# Patient Record
Sex: OTHER/UNKNOWN | Race: WHITE | URBAN - METROPOLITAN AREA
[De-identification: names, ages, dates, MRNs, and addresses within clinical notes are randomized per-mention and may not be internally consistent; named-entity substitution may affect disease eponyms.]

---

## 2022-02-14 ENCOUNTER — OFFICE VISIT (OUTPATIENT)
Dept: PRIMARY CARE CLINIC | Age: 47
End: 2022-02-14
Payer: COMMERCIAL

## 2022-02-14 VITALS
OXYGEN SATURATION: 97 % | SYSTOLIC BLOOD PRESSURE: 124 MMHG | DIASTOLIC BLOOD PRESSURE: 76 MMHG | HEART RATE: 81 BPM | BODY MASS INDEX: 34.04 KG/M2 | WEIGHT: 185 LBS | RESPIRATION RATE: 16 BRPM | HEIGHT: 62 IN

## 2022-02-14 DIAGNOSIS — M79.672 FOOT PAIN, BILATERAL: ICD-10-CM

## 2022-02-14 DIAGNOSIS — M79.671 FOOT PAIN, BILATERAL: ICD-10-CM

## 2022-02-14 DIAGNOSIS — Z76.89 ENCOUNTER TO ESTABLISH CARE: Primary | ICD-10-CM

## 2022-02-14 DIAGNOSIS — Z12.4 CERVICAL CANCER SCREENING: ICD-10-CM

## 2022-02-14 DIAGNOSIS — Z12.11 COLON CANCER SCREENING: ICD-10-CM

## 2022-02-14 DIAGNOSIS — M25.562 ACUTE PAIN OF LEFT KNEE: ICD-10-CM

## 2022-02-14 PROCEDURE — 99204 OFFICE O/P NEW MOD 45 MIN: CPT | Performed by: NURSE PRACTITIONER

## 2022-02-14 SDOH — ECONOMIC STABILITY: FOOD INSECURITY: WITHIN THE PAST 12 MONTHS, YOU WORRIED THAT YOUR FOOD WOULD RUN OUT BEFORE YOU GOT MONEY TO BUY MORE.: NEVER TRUE

## 2022-02-14 SDOH — ECONOMIC STABILITY: FOOD INSECURITY: WITHIN THE PAST 12 MONTHS, THE FOOD YOU BOUGHT JUST DIDN'T LAST AND YOU DIDN'T HAVE MONEY TO GET MORE.: NEVER TRUE

## 2022-02-14 ASSESSMENT — PATIENT HEALTH QUESTIONNAIRE - PHQ9
2. FEELING DOWN, DEPRESSED OR HOPELESS: 0
SUM OF ALL RESPONSES TO PHQ9 QUESTIONS 1 & 2: 0
1. LITTLE INTEREST OR PLEASURE IN DOING THINGS: 0
SUM OF ALL RESPONSES TO PHQ QUESTIONS 1-9: 0

## 2022-02-14 ASSESSMENT — SOCIAL DETERMINANTS OF HEALTH (SDOH): HOW HARD IS IT FOR YOU TO PAY FOR THE VERY BASICS LIKE FOOD, HOUSING, MEDICAL CARE, AND HEATING?: NOT HARD AT ALL

## 2022-02-14 NOTE — PROGRESS NOTES
[facility] PRIMARY CARE  . [address]  [address] [address]  Dept: [phone]  Dept Fax: [phone]    Mariella Fuentes is a 56 y.o. adult who presents today for [facility] medical conditions/complaintsas noted below. Mariella Fuentes is c/o of Knee Pain (LT-meniscus/patella pain), Flu Vaccine (received outside of [place] [address]), and Colon Cancer Screening (order cologuard)        HPI:     Pt presents for an office   bp stable  Weight stable    Pt presents for an office visit. She is training for a race. She stopped running 2 years ago. She is getting back into it. She is doin ga 10 mile race in April. She is unsure if its an IT or runners knee. She has not changed her shoes until recently. 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 Podiatrist . No previous issues with her knee that lasted this long. She would like to go to PT and has a place in mind. She has not ran in 4 days. No longer has gyn. Las pap was a long time ago  Cycles are irregular. This just started to be irregular. Otherwise healthy. Working on losing weight and being more active. History reviewed. No pertinent past medical history. History reviewed. No pertinent surgical history. History reviewed. No pertinent family history. Social History     Tobacco Use    Smoking status: Passive Smoke Exposure - Never Smoker    Smokeless tobacco: Never Used   Substance Use Topics    Alcohol use: Yes     Comment: socially, twice monthly      No current outpatient medications on file. No current facility-administered medications for this visit.      No Known Allergies    Health Maintenance   Topic Date Due    Hepatitis C screen  Never done    HIV screen  Never done    DTaP/Tdap/Td vaccine (1 - Tdap) Never done    Diabetes screen  Never done    Lipid screen  Never done    Colon cancer screen colonoscopy  Never done    Flu vaccine (1) 2022 (Originally 2021)   Lorri COVID-19 Vaccine (1) 12/29/2022 (Originally 2/8/1981)    Depression Screen  02/14/2023    Hepatitis A vaccine  Aged Out    Hepatitis B vaccine  Aged Out    Hib vaccine  Aged Out    Meningococcal (ACWY) vaccine  Aged Out    Pneumococcal 0-64 years Vaccine  Aged Out       :     Review of Systems   Constitutional: Negative for chills, fatigue and fever. HENT: Negative for ear discharge, ear pain, sinus pressure, sinus pain, sore throat and trouble swallowing. Eyes: Negative for discharge, redness and itching. Respiratory: Negative for cough, chest tightness, shortness of breath and wheezing. Cardiovascular: Negative for chest pain. Gastrointestinal: Negative for abdominal pain, diarrhea, nausea and vomiting. Genitourinary: Negative for difficulty urinating. Musculoskeletal: Negative for arthralgias (left knee pain ) and neck pain. Skin: Negative for rash. Neurological: Negative for dizziness, weakness, light-headedness and headaches. All other systems reviewed and are negative. Objective:     Physical Exam  Constitutional:       Appearance: Normal appearance. Vicente Prim is normal weight. HENT:      Head: Normocephalic and atraumatic. Nose: Nose normal.   Eyes:      Extraocular Movements: Extraocular movements intact. Conjunctiva/sclera: Conjunctivae normal.      Pupils: Pupils are equal, round, and reactive to light. Cardiovascular:      Rate and Rhythm: Normal rate and regular rhythm. Pulses: Normal pulses. Heart sounds: Normal heart sounds. Pulmonary:      Effort: Pulmonary effort is normal.      Breath sounds: Normal breath sounds. Abdominal:      General: Abdomen is flat. Palpations: Abdomen is soft. Musculoskeletal:         General: Normal range of motion. Cervical back: Neck supple. Left knee: No bony tenderness or crepitus. Normal range of motion. No tenderness. No LCL laxity, MCL laxity, ACL laxity or PCL laxity.   Skin: General: Skin is warm and dry. Capillary Refill: Capillary refill takes less than 2 seconds. Neurological:      General: No focal deficit present. Mental Status: Clearance Lynn is alert and oriented to person, place, and time. Psychiatric:         Mood and Affect: Mood normal.       /76   Pulse 81   Resp 16   Ht 5' 2\" (1.575 m)   Wt 185 lb (83.9 kg)   LMP 01/19/2022 (Approximate)   SpO2 97%   Breastfeeding No   BMI 33.84 kg/m²     Assessment:       Diagnosis Orders   1. Acute pain of left knee  [facility] - [place]    XR KNEE LEFT (MIN 4 VIEWS)   2. Colon cancer screening  Fecal DNA Colorectal cancer screening (Cologuard)   3. Foot pain, bilateral  [facility] DPM, Podiatry, [place]   4. Cervical cancer screening  [facility] [place], [address] [place] [address], Gynecology, [place]       :      Return in about 1 month (around 3/14/2022) for knee follow up. 1.  Encounter establish care  -Reviewed available records. Limited information provided  2. Acute left knee pain  -Rx for x-rays.  -Referral to physical therapy  -Discussed ice, rest, elevation and anti-inflammatories  3. Colon cancer screening  Rx for Cologuard  4. Foot pain   referral to podiatry  5. Cervical cancer screening  -Referral to gynecology    Patient follow-up in 1 month for reevaluation  Declines annual lab work  Orders Placed This Encounter   Procedures    Fecal DNA Colorectal cancer screening (Cologuard)    XR KNEE LEFT (MIN 4 VIEWS)     Standing Status:   Future     Standing Expiration Date:   2/14/2023     Order Specific Question:   Reason for exam:     Answer:   left knee pain.  started running   [address] [place]     Referral Priority:   Routine     Referral Type:   Eval and Treat     Referral Reason:   Specialty Services Required     Requested Specialty:   Physical Therapy     Number of Visits Requested:   [facility] Ping Bowens DPM, Podiatry, [place]     Referral Priority:   Routine     Referral Type:   Eval and Treat     Referral Reason:   Specialty Services Required     Referred to Provider:   Aniyah Reynoso DPM     Requested Specialty:   Podiatry     Number of Visits Requested:   [facility] KATIE Trammell, Gynecology, [facility]     Referral Priority:   Routine     Referral Type:   Eval and Treat     Referral Reason:   Specialty Services Required     Referred to Provider:   LELA Chopra CNP     Requested Specialty:   [facility] Nurse Practitioner     Number of Visits Requested:   1     No orders of the defined types were placed in this encounter. Patient given educational materials - seepatient instructions. Discussed use, benefit, and side effects of prescribed medications. All patient questions answered. Pt voiced understanding. Reviewed health maintenance. Instructed to continue current medications, diet and exercise. Patient agreedwith treatment plan. Follow up as directed.       Electronically signed by LELA Pastrana CNP on 2/15/2022at 8:23 AM

## 2022-02-15 ENCOUNTER — HOSPITAL ENCOUNTER (OUTPATIENT)
Dept: PHYSICAL THERAPY | Facility: CLINIC | Age: 47
Setting detail: THERAPIES SERIES
End: 2022-02-15
Payer: COMMERCIAL

## 2022-02-15 ASSESSMENT — ENCOUNTER SYMPTOMS
SINUS PAIN: 0
ABDOMINAL PAIN: 0
TROUBLE SWALLOWING: 0
COUGH: 0
SINUS PRESSURE: 0
WHEEZING: 0
DIARRHEA: 0
NAUSEA: 0
CHEST TIGHTNESS: 0
EYE ITCHING: 0
VOMITING: 0
EYE DISCHARGE: 0
SORE THROAT: 0
SHORTNESS OF BREATH: 0
EYE REDNESS: 0

## 2022-02-23 ENCOUNTER — HOSPITAL ENCOUNTER (OUTPATIENT)
Dept: PHYSICAL THERAPY | Facility: CLINIC | Age: 47
Setting detail: THERAPIES SERIES
Discharge: HOME | End: 2022-02-23
Payer: COMMERCIAL

## 2022-02-23 PROCEDURE — 97161 PT EVAL LOW COMPLEX 20 MIN: CPT

## 2022-02-23 PROCEDURE — 97110 THERAPEUTIC EXERCISES: CPT
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
## 2022-03-01 ENCOUNTER — HOSPITAL ENCOUNTER (OUTPATIENT)
Dept: PHYSICAL THERAPY | Facility: CLINIC | Age: 47
Setting detail: THERAPIES SERIES
Discharge: HOME | End: 2022-03-01
Payer: COMMERCIAL

## 2022-03-01 PROCEDURE — 97140 MANUAL THERAPY 1/> REGIONS: CPT

## 2022-03-01 PROCEDURE — 97750 PHYSICAL PERFORMANCE TEST: CPT

## 2022-03-01 PROCEDURE — 97110 THERAPEUTIC EXERCISES: CPT

## 2022-03-01 NOTE — FLOWSHEET NOTE
[] [facility]) - [facility] &  Therapy  [address]  [phone]  F: [phone] [] [address]  [address]   [address]  P: [phone]  F: [phone] [x] [address] &  Therapy  [address] [address]  P: [phone]  F: [phone] [] [address]  P: [phone]  F: [phone] [] [address]  [facility]   [address]   [place]: [phone]  F: [phone]      Physical Therapy Daily Treatment Note    Date:  3/1/2022  Patient Name:  Loel Credit    :  1975  MRN: [id number]  Physician: POPPY Mcdowell                           Insurance: Carol Garibay (100 visits/yr; no copay, ded 3000/2914 remains, 20%)  Medical Diagnosis: L knee pain                   Rehab Codes: M25.562, M25.662  Onset date:    1/15/22                                    Next Dr's appt.:3/2  Visit# / total visits: 2/10     Cancels/No Shows: 0    Subjective:    Pain:  [x] Yes  [] No Location: knee  Pain Rating: (0-10 scale) 0/10  Pain altered Tx:  [x] No  [] Yes  Action:  Comments:Pt reports she did try running 1 min on 1 min off for 2 miles and it was like night and day. Knee felt so much better    Objective:  Manual:Hypervolt B calf   Precautions:standard  Exercises:  Exercise Reps/ Time Weight/ Level Issued for HEP   Comments   Prone             Knee hang 5'   x *     Hip ext (glut max)             [place] hip ext 30-50           Supine             Quad sets 10x10\"   x      SAQ 20 5 lbs x      Tony calf stretch 3x30\"   x * Hips to remain in neutral to ext   [employer]             Sidelying             Clams 90/30 deg             Susan hip abd             Quadruped             Donkey kicks 30-50       Bar across pelvis   [place] twist             Gym             Burrito stretch 3x30\"   x *     Monster walks             Hip thrusts             Step downs         Posterior and lateral   GTE contract and activate 10x10\"  x x    Toe yoga x10  x x    Toe spreading *  x     Fig 8 banded HR x20 orange x x                                      Specific Instructions for next treatment:  1. Recheck knee extension, ankle DF, GTE   2. Progress glute med and max strengthening ex, add split squat isometric  3. STM to calves, glutes  4. Recheck foot/ankle control            Video Run Analysis   Warm up: 2min walk   Pace: 15:48  min/mile   Duration: 7 minutes    Shoes: [employer]ni + custom orthotics   Jackie: 160 spm    Frontal plane deviations:    Increased pronation    Dynamic genu varus    Contralateral pelvic drop    Increased hip adduction       Sagittal plane deviations:     Near full knee extension at initial contact    Limited hip ext from midstance to toe off    Knees extend over toes at midstance    Increased forward trunk lean           Treatment Charges: Mins Units   []  Modalities     [x]  Ther Exercise 35 2   [x]  Manual Therapy 15 1   []  Ther Activities     [x]  Physical Perf Eval 5 1   []  Vasocompression     []  Other     Total Treatment time 55 4       Assessment: [x] Progressing toward goals. Pt has less pain at the knee. Still lacking knee extension upon arrival but improved after manual and stretching at calf. Physical Perf eval completed with run  flaws as above. After cues given for posture to be more upright some of mechanical flaws improved. Still need to gain strength to correct some of the frontal plane deviations that are leading to knee varus. [] No change.      [] Other:  [x] Patient would continue to benefit from skilled physical therapy services in order to:increase knee ext ROM to 0 deg,improve frontal plane stabilization and SL squat and give guidance on her training program so that she can return to running and complete the 2 day race [facility]      STG: (to be met in 5 treatments)  1. ? Pain:<3/10 so that she can initiate a return to run  2. ? ROM: achieve 0 deg of knee ext  3. ? Strength: at least 4/5 with nona hip ext  4. ? Function: able to run/walk 1.5 miles with <3/10 pain  5. UWRI score to >24/36  6. Independent with Home Exercise Programs     LTG: (to be met in 10 treatments)  1. No pain in L knee  2. MMT with nona hip ext to 5/5  3. UWRI score to >30/36  4. Able to return to running so that she can complete [place] 10k and 10m. 5.                   Patient goals: \"get rid of the knee pain so I can run\"    Pt. Education:  [x] Yes  [] No  [] Reviewed Prior HEP/Ed  Method of Education: [x] Verbal  [x] Demo  [x] Written  Access Code: [email or web address]  URL: [email or web address]  Date: 03/01/2022  Prepared by: Isabel Dials    Exercises  Bridge - 1 x daily - 7 x weekly - 3 sets - 10 reps  Sidelying Hip Abduction at Fidelia Delroy - 1 x daily - 7 x weekly - 3 sets - 10 reps  Heel rises with counter support - 1 x daily - 7 x weekly - 3 sets - 10 reps  Seated Lesser Toes Extension - 2 x daily - 7 x weekly - 3 sets - 10 reps  Seated Great Toe Extension - 2 x daily - 7 x weekly - 3 sets - 10 reps  Seated Self Great Toe Stretch - 2 x daily - 7 x weekly - 10 reps - 10sec hold  Toe Spreading - 2 x daily - 7 x weekly - 3 sets - 10 reps    Comprehension of Education:  [x] Verbalizes understanding. [] Demonstrates understanding. [] Needs review. [] Demonstrates/verbalizes HEP/Ed previously given. Plan: [x] Continue current frequency toward long and short term goals.     [] Specific Instructions for subsequent treatments:       Time In:1300          Time Out: 1410    Electronically signed by:  Frances Marcial, PT

## 2022-03-08 ENCOUNTER — HOSPITAL ENCOUNTER (OUTPATIENT)
Dept: PHYSICAL THERAPY | Facility: CLINIC | Age: 47
Setting detail: THERAPIES SERIES
Discharge: HOME | End: 2022-03-08
Payer: COMMERCIAL

## 2022-03-08 PROCEDURE — 97140 MANUAL THERAPY 1/> REGIONS: CPT

## 2022-03-08 PROCEDURE — 97112 NEUROMUSCULAR REEDUCATION: CPT

## 2022-03-08 PROCEDURE — 97110 THERAPEUTIC EXERCISES: CPT

## 2022-03-08 NOTE — FLOWSHEET NOTE
[] [facility]) - [facility] &  Therapy  [address]  [phone]  F: [phone] [] [address]  [address]   [address]  P: [phone]  F: [phone] [x] [address] &  Therapy  [address] [facility] [address]  P: [phone]  F: [phone] [] [address]  P: [phone]  F: [phone] [] [address]  [facility]   [address]   [place]: [phone]  F: [phone]      Physical Therapy Daily Treatment Note    Date:  3/8/2022  Patient Name:  Ashley Gaitan    :  1975  MRN: [id number]  Physician: Jeaneth Rebollar, APRN-CNP                           Insurance: [employer] (100 visits/yr; no copay, ded 3000/2911 remains, 20%)  Medical Diagnosis: L knee pain                   Rehab Codes: M25.562, M25.662  Onset date:    1/15/22                                    Next Dr's appt.:3/2  Visit# / total visits: 3/10     Cancels/No Shows: 0    Subjective:    Pain:  [x] Yes  [] No Location: knee  Pain Rating: (0-10 scale) 0/10  Pain altered Tx:  [x] No  [] Yes  Action:  Comments:Pt reports she did well for running. Was able to do 1'on4'off x6 cycles. The first time there was mild pain. Second time no pain.     Objective:  Manual:Hypervolt B calf   Precautions:standard  Exercises:  Exercise Reps/ Time Weight/ Level Issued for HEP   Comments   Prone             Knee hang 5'   x *     Hip ext (glut max)  2x10      x     [place] hip ext            Supine             Quad sets 10x10\"   x      SAQ 20 5 lbs x      Tony calf stretch 3x30\"   x * Hips to remain in neutral to ext   Bridge              Sidelying             Clams 90/30 deg             hip abd  2x10   x  x     Quadruped             Donkey kicks        Bar across pelvis   [place] twist             Gym             Burrito stretch 3x30\"   x *     Monster walks             Hip thrusts             Step downs         Posterior and lateral   GTE contract and activate 10x10\"  x x    Toe yoga x10  x x    Toe spreading *  x     Fig 8 banded HR x20 orange x x    Isometric split squat 3x30\"  x x                              Specific Instructions for next treatment:  1. Recheck knee extension, ankle DF, GTE   2. Progress glute med and max strengthening ex, add split squat isometric  3. STM to calves, glutes  4. Recheck foot/ankle control            NMR   Warm up: 2min walk   Pace: 15:48  min/mile   Duration: 15 minutes 2'on/3'off x3   Shoes: [employer] + custom orthotics   Jackie: 164 spm    Cue:lift knees            Treatment Charges: Mins Units   []  Modalities     [x]  Ther Exercise 30 2   [x]  Manual Therapy 15 1   []  Ther Activities     []  Physical Perf Eval     []  Vasocompression     [x]  NMR 15 1   Total Treatment time 60 4       Assessment: [x] Progressing toward goals. Knee extension lacking 1 deg. Pt is still lacking control at foot and ankle and strength at quad. Addressed this with Fig 8 Banded heel raises and split squat isometric to aide mechanics at midstance of running. Posture has improved significantly during run with pt pt more upright and landing closer to COM at IC [] No change. [] Other:  [x] Patient would continue to benefit from skilled physical therapy services in order to:increase knee ext ROM to 0 deg,improve frontal plane stabilization and SL squat and give guidance on her training program so that she can return to running and complete the 2 day race at [facility]      STG: (to be met in 5 treatments)  1. ? Pain:<3/10 so that she can initiate a return to run  2. ? ROM: achieve 0 deg of knee ext  3. ? Strength: at least 4/5 with nona hip ext  4. ? Function: able to run/walk 1.5 miles with <3/10 pain  5. UWRI score to >24/36  6.  Independent with Home Exercise Programs     LTG: (to be met in 10

## 2022-03-15 ENCOUNTER — OFFICE VISIT (OUTPATIENT)
Dept: PRIMARY CARE CLINIC | Age: 47
End: 2022-03-15
Payer: COMMERCIAL

## 2022-03-15 VITALS
DIASTOLIC BLOOD PRESSURE: 76 MMHG | OXYGEN SATURATION: 98 % | WEIGHT: 191 LBS | RESPIRATION RATE: 14 BRPM | BODY MASS INDEX: 34.93 KG/M2 | SYSTOLIC BLOOD PRESSURE: 116 MMHG | HEART RATE: 84 BPM

## 2022-03-15 DIAGNOSIS — M25.562 ACUTE PAIN OF LEFT KNEE: Primary | ICD-10-CM

## 2022-03-15 PROCEDURE — 99213 OFFICE O/P EST LOW 20 MIN: CPT | Performed by: NURSE PRACTITIONER

## 2022-03-15 ASSESSMENT — PATIENT HEALTH QUESTIONNAIRE - PHQ9
SUM OF ALL RESPONSES TO PHQ9 QUESTIONS 1 & 2: 0
SUM OF ALL RESPONSES TO PHQ QUESTIONS 1-9: 0
SUM OF ALL RESPONSES TO PHQ QUESTIONS 1-9: 0
1. LITTLE INTEREST OR PLEASURE IN DOING THINGS: 0
SUM OF ALL RESPONSES TO PHQ QUESTIONS 1-9: 0
SUM OF ALL RESPONSES TO PHQ QUESTIONS 1-9: 0
2. FEELING DOWN, DEPRESSED OR HOPELESS: 0

## 2022-03-15 ASSESSMENT — ENCOUNTER SYMPTOMS
EYE DISCHARGE: 0
SORE THROAT: 0
ABDOMINAL PAIN: 0
CHEST TIGHTNESS: 0
COUGH: 0
TROUBLE SWALLOWING: 0
NAUSEA: 0
EYE REDNESS: 0
SINUS PRESSURE: 0
DIARRHEA: 0
SHORTNESS OF BREATH: 0
SINUS PAIN: 0
WHEEZING: 0
EYE ITCHING: 0
VOMITING: 0

## 2022-03-15 NOTE — PROGRESS NOTES
[facility] PRIMARY CARE  . [address]  [address] [address]  Dept: [phone]  Dept Fax: [phone]    Cristina Rea is a 56 y.o. adult who presents today for [facility] medical conditions/complaintsas noted below. Cristina Rea is c/o of 1 Month Follow-Up (knee f/u)        HPI:     Pt presents for a one month follow up  bp stable  Weight is stable    Pt presents for a one month follow up for her knee. She has been going to PT. Her knee is a lot better. She is running on the treadmill at  and they are helping her running form. She did a 5 K yesterday and she felt good. She still has occasional knee pain but overall much better. She plans on continuing with PT until they says she is done. She has no other concerns today. History reviewed. No pertinent past medical history. History reviewed. No pertinent surgical history. History reviewed. No pertinent family history. Social History     Tobacco Use    Smoking status: Passive Smoke Exposure - Never Smoker    Smokeless tobacco: Never Used   Substance Use Topics    Alcohol use: Yes     Comment: socially, twice monthly      No current outpatient medications on file. No current facility-administered medications for this visit.      No Known Allergies    Health Maintenance   Topic Date Due    Hepatitis C screen  Never done    HIV screen  Never done    Diabetes screen  Never done    Lipid screen  Never done    Colorectal Cancer Screen  Never done    Flu vaccine (1) 06/30/2022 (Originally 9/1/2021)    COVID-19 Vaccine (1) 12/29/2022 (Originally 2/8/1981)    DTaP/Tdap/Td vaccine (1 - Tdap) 03/15/2023 (Originally 2/8/1995)    Depression Screen  02/14/2023    Hepatitis A vaccine  Aged Out    Hepatitis B vaccine  Aged Out    Hib vaccine  Aged Out    Meningococcal (ACWY) vaccine  Aged Out    Pneumococcal 0-64 years Vaccine  Aged Out       :     Review of Systems Constitutional: Negative for chills, fatigue and fever. HENT: Negative for ear discharge, ear pain, sinus pressure, sinus pain, sore throat and trouble swallowing. Eyes: Negative for discharge, redness and itching. Respiratory: Negative for cough, chest tightness, shortness of breath and wheezing. Cardiovascular: Negative for chest pain. Gastrointestinal: Negative for abdominal pain, diarrhea, nausea and vomiting. Genitourinary: Negative for difficulty urinating. Musculoskeletal: Positive for arthralgias (left knee pain ). Negative for neck pain. Skin: Negative for rash. Neurological: Negative for dizziness, weakness, light-headedness and headaches. All other systems reviewed and are negative. Objective:     Physical Exam  Constitutional:       Appearance: Normal appearance. Josemanuel Sandoval is normal weight. HENT:      Head: Normocephalic and atraumatic. Nose: Nose normal.   Eyes:      Extraocular Movements: Extraocular movements intact. Conjunctiva/sclera: Conjunctivae normal.      Pupils: Pupils are equal, round, and reactive to light. Cardiovascular:      Rate and Rhythm: Normal rate and regular rhythm. Pulses: Normal pulses. Heart sounds: Normal heart sounds. Pulmonary:      Effort: Pulmonary effort is normal.      Breath sounds: Normal breath sounds. Abdominal:      General: Abdomen is flat. Palpations: Abdomen is soft. Musculoskeletal:         General: Normal range of motion. Cervical back: Neck supple. Left knee: Normal range of motion. No tenderness. Skin:     General: Skin is warm and dry. Capillary Refill: Capillary refill takes less than 2 seconds. Neurological:      General: No focal deficit present. Mental Status: Josemanuel Sandoval is alert and oriented to person, place, and time.    Psychiatric:         Mood and Affect: Mood normal.       /76   Pulse 84   Resp 14   Wt 191 lb (86.6 kg)   SpO2 98% Breastfeeding No   BMI 34.93 kg/m²     Assessment:       Diagnosis Orders   1. Acute pain of left knee         :      Return in about 1 year (around 3/15/2023) for physical .  1. Acute knee pain. -c/w PT   -improving    She has no further concerns at this time. She is ok with f/u in one year for her annual physical.       Patient given educational materials - seepatient instructions. Discussed use, benefit, and side effects of prescribed medications. All patient questions answered. Pt voiced understanding. Reviewed health maintenance. Instructed to continue current medications, diet and exercise. Patient agreedwith treatment plan. Follow up as directed.       Electronically signed by LELA Savage CNP on 3/15/2022at 9:30 PM

## 2022-03-17 ENCOUNTER — HOSPITAL ENCOUNTER (OUTPATIENT)
Dept: PHYSICAL THERAPY | Facility: CLINIC | Age: 47
Setting detail: THERAPIES SERIES
Discharge: HOME | End: 2022-03-17
Payer: COMMERCIAL

## 2022-03-17 PROCEDURE — 97140 MANUAL THERAPY 1/> REGIONS: CPT

## 2022-03-17 PROCEDURE — 97110 THERAPEUTIC EXERCISES: CPT

## 2022-03-17 NOTE — FLOWSHEET NOTE
[] [facility]) - [facility] &  Therapy  [address]  [phone]  F: [phone] [] [address]  [employer] [address]   [address]  P: [phone]  F: [phone] [x] [address] &  Therapy  [address]  P: [phone]  F: [phone] [] [address]  P: [phone]  F: [phone] [] [address]  [facility]   [address]   [place]: [phone]  F: [phone]      Physical Therapy Daily Treatment Note    Date:  3/17/2022  Patient Name:  Zeb Reyes    :  1975  MRN: [id number]  Physician: Mikayla Muro APRN-CNP                           Insurance: [employer] Sy (100 visits/yr; no copay, ded 3000/2919 remains, 20%)  Medical Diagnosis: L knee pain                   Rehab Codes: M25.562, M25.662  Onset date:    1/15/22                                    Next Dr's appt.:3/2  Visit# / total visits: 3/10     Cancels/No Shows: 0    Subjective:    Pain:  [x] Yes  [] No Location: knee  Pain Rating: (0-10 scale) 2/10  Pain altered Tx:  [x] No  [] Yes  Action:  Comments:Pt reports she is running every other day 2'on/3'off for 5K. Feeling pretty good.  Not pain free but it does resolve quickly post.    Objective:  Manual:Hypervolt B calf   Precautions:standard  Exercises:  Exercise Reps/ Time Weight/ Level Issued for HEP   Comments   Prone             Knee hang 5'   x      Hip ext (glut max)  2x10           [place] hip ext            Supine             Hip flexor stretch 1'ea   x    Quad sets 10x10\"   x      Bridge w/ SAQ 20 5 lbs x      Bridge              Sidelying             Clams 90/30 deg             hip abd  2x10   x  x     Quadruped             Donkey kicks        Bar across pelvis   [place] twist             Gym             Burrito stretch 3x30\"   x x     Monster walks  1 lap  blue  x  x  ankles   Hip thrusts             Step downs  2x10  4\"  x  x Posterior and lateral   GTE contract and activate 10x10\"  x     Toe yoga x10  x     Toe spreading *  x     Fig 8 banded HR x20 orange x x    Isotonic split squat 2x10  x x    Eccentric calf 2x10  x x    Soleus HR from split squat position 2x10  x x              Specific Instructions for next treatment:  1. Recheck knee extension, ankle DF, GTE   2. Progress glute med and max strengthening ex,  3. STM to calves, glutes  4. Recheck foot/ankle control                Treatment Charges: Mins Units   []  Modalities     [x]  Ther Exercise 40 3   [x]  Manual Therapy 15 1   []  Ther Activities     []  Physical Perf Eval     []  Vasocompression     []  NMR     Total Treatment time 55 4       Assessment: [x] Progressing toward goals. Knee extension is full, hip extension stiff end range, as well as ankle DF lacking 3 deg knee straight. Poor ankle control on calf exercises especially with soleus. Added lateral and posterior step downs. As long a pt had a cue from the foam roller to unlock her hip she was able to perform without knee pain. Weakness noted eccentrically with quad but did a good job after cues to correct frontal and transverse plane deviations that were causing genu valgus   [] No change. [] Other:  [x] Patient would continue to benefit from skilled physical therapy services in order to:increase knee ext ROM to 0 deg,improve frontal plane stabilization and SL squat and give guidance on her training program so that she can return to running and complete the 2 day race at [address]      STG: (to be met in 5 treatments)  1. ? Pain:<3/10 so that she can initiate a return to run  2. ? ROM: achieve 0 deg of knee ext  3. ? Strength: at least 4/5 with nona hip ext  4. ? Function: able to run/walk 1.5 miles with <3/10 pain  5. UWRI score to >24/36  6.  Independent with Home Exercise Programs     LTG: (to be met in

## 2022-03-29 ENCOUNTER — HOSPITAL ENCOUNTER (OUTPATIENT)
Dept: PHYSICAL THERAPY | Facility: CLINIC | Age: 47
Setting detail: THERAPIES SERIES
Discharge: HOME | End: 2022-03-29
Payer: COMMERCIAL

## 2022-03-29 PROCEDURE — 97110 THERAPEUTIC EXERCISES: CPT

## 2022-03-29 NOTE — FLOWSHEET NOTE
Donkey kicks        Bar across pelvis   [place] twist             Gym             Burrito stretch 3x30\"   x      Monster walks  1 lap  blue  x    ankles   Hip thrusts             Step downs  2x10  4\"  x   Posterior and lateral   Ball roll into GTE stetch 10x roll  Then 1' hold x2  x x    Toe yoga x10  x     Toe spreading *  x     Lax ball  HR x20  x x Squeeze at the top   Isotonic split squat 2x10  x x    Eccentric calf 2x10  x     Soleus HR from split squat position 2x10  x               Specific Instructions for next treatment:  1. Recheck for possible DC    ROM   Knee extension 0 deg  Hip ext WNL B  Ankle DF 10 deg B  GTE 10% limited    Strength  Hip abd 5/5  Hip ER 5/5  Hip ext R4+/5 L 4/5    Single leg squat L mild genu valgus R genu valgus and collapse of midfoot  FOM  UWRI 31/36             Treatment Charges: Mins Units   []  Modalities     [x]  Ther Exercise 25 2   []  Manual Therapy     []  Ther Activities     []  Physical Perf Eval     []  Vasocompression     []  NMR     Total Treatment time 25 2       Assessment: [x] Progressing toward goals. Pt is making great progress. She still has some ROM deficit at great toe and ankles are stiff. She also fatigues quickly at quad and tests weak at glute max. She is racing this weekend at [facility]. 10K Sat, 10 miles Sunday. Cautioned about the load of running when she has been inconsistent. She voiced understanding of reinjury/exacerbation risk. She will modify HEP for great toe stretching. [] No change.      [] Other:  [x] Patient would continue to benefit from skilled physical therapy services in order to:increase knee ext ROM to 0 deg,improve frontal plane stabilization and SL squat and give guidance on her training program so that she can return to running and complete the 2 day race at [facility]      STG: (to be met in 5 treatments)  1. ? Pain:<3/10 so that she can initiate a return to run MET  2. ? ROM: achieve 0 deg of knee ext Met  3. ? Strength: at least 4/5 with nona hip ext MET  4. ? Function: able to run/walk 1.5 miles with <3/10 pain Met  5. UWRI score to >24/36 MET  6. Independent with Home Exercise Programs Met     LTG: (to be met in 10 treatments)  1. No pain in L knee  Pogressing  2. MMT with nona hip ext to 5/5  Pogressing  3. UWRI score to >30/36  Met  4. Able to return to running so that she can complete [place] 10k and 10m. Ongoing                    Patient goals: \"get rid of the knee pain so I can run\"    Pt. Education:  [x] Yes  [] No  [] Reviewed Prior HEP/Ed  Method of Education: [x] Verbal  [x] Demo  [] Written      Comprehension of Education:  [x] Verbalizes understanding. [] Demonstrates understanding. [] Needs review. [] Demonstrates/verbalizes HEP/Ed previously given. Plan: [x] Continue current frequency toward long and short term goals.     [] Specific Instructions for subsequent treatments:       Time In:9226        Time Out: 1921    Electronically signed by:  Too Obregon, PT

## 2022-04-12 ENCOUNTER — HOSPITAL ENCOUNTER (OUTPATIENT)
Dept: PHYSICAL THERAPY | Facility: CLINIC | Age: 47
Setting detail: THERAPIES SERIES
Discharge: HOME | End: 2022-04-12

## 2022-04-12 NOTE — DISCHARGE SUMMARY
[] [facility]) - [facility] &  Therapy  [address]  [phone]  F: [phone] [] [address]  [address] [address]   [address]  P: [phone]  F: [phone] [] [address] &  Therapy  [address]  P: [phone]  F: [phone] [] [address]  P: [phone]  F: [phone] [] [address]  [facility]   [address]   [place]: [phone]  F: [phone]      Physical Therapy Discharge Note    Date: 2022      Patient: Surendra Garcia  : 1975  MRN: [id number]    Thomas Rojas, APRN-CNP                           Insurance: [facility] (100 visits/yr; no copay, ded 3000/2912 remains, 20%)  Medical Diagnosis: L knee pain                   Rehab Codes: M25.562, M25.662  Onset date:    1/15/22                                    Next Dr's appt.:3/2  Visit# / total visits: 5/10      Date of initial visit: 22               Date of final visit: 3/29/22      Subjective:  Pain:  [] Yes  [] No  Location: L knee N/A Pain Rating: (0-10 scale) 0/10  Pain altered Tx:  [x] No  [] Yes  Action:  Comments:Pt communicated via text that she completed the 10k race that she wished to do but did not start the 10 miler secondary to fear of injury due to lack of training for the distance. States she will continue to work up to this distance and wishes to be discharged. Objective:  ROM   Knee extension 0 deg  Hip ext WNL B  Ankle DF 10 deg B  GTE 10% limited     Strength  Hip abd 5/5  Hip ER 5/5  Hip ext R4+/5 L 4/5     Single leg squat L mild genu valgus R genu valgus and collapse of midfoot  FOM  UWRI 31/    Assessment:  Pt cancelled final appt as she states she is feeling good.  Was able to complete a 10k  But did not start 10 miler the following day. She has progressed or met goals as below. STG: (to be met in 5 treatments)  1. ? Pain:<3/10 so that she can initiate a return to run MET  2. ? ROM: achieve 0 deg of knee ext Met  3. ? Strength: at least 4/5 with nona hip ext MET  4. ? Function: able to run/walk 1.5 miles with <3/10 pain Met  5. UWRI score to >24/36 MET  6. Independent with Home Exercise Programs Met     LTG: (to be met in 10 treatments)  1. No pain in L knee  Pogressing  2. MMT with nona hip ext to 5/5  Pogressing  3. UWRI score to >30/36  Met  4. Able to return to running so that she can complete [place] 10k and 10m. Ongoing      Treatment to Date:  [x] Therapeutic Exercise    [] Modalities:  [] Therapeutic Activity    [] Ultrasound  [] Electrical Stimulation  [] Gait Training     [] Massage       [] Lumbar/Cervical Traction  [x] Neuromuscular Re-education [] Cold/hotpack [] Iontophoresis: 4 mg/mL  [x] Instruction in Home Exercise Program                     Dexamethasone Sodium  [x] Manual Therapy             Phosphate 40-80 mAmin  [] Aquatic Therapy                   [] Vasocompression/    [x] Other:Physcial Performance Eval           Discharge Status:     [] Pt recovered from conditions. Treatment goals were met. [] Pt received maximum benefit. No further therapy indicated at this time. [x] Pt to continue exercise/home instructions independently. [] Therapy interrupted due to:    [] Pt has 2 or more no shows/cancels, is discontinued per our policy. [] Pt has completed prescribed number of treatment sessions. [] Other:         Electronically signed by Vishal Santoyo PT on 4/12/2022 at 2:10 PM      If you have any questions or concerns, please don't hesitate to call.   Thank you for your referral.

## 2024-02-05 ASSESSMENT — PATIENT HEALTH QUESTIONNAIRE - PHQ9
2. FEELING DOWN, DEPRESSED OR HOPELESS: 0
SUM OF ALL RESPONSES TO PHQ9 QUESTIONS 1 & 2: 0
1. LITTLE INTEREST OR PLEASURE IN DOING THINGS: 0
SUM OF ALL RESPONSES TO PHQ QUESTIONS 1-9: 0
2. FEELING DOWN, DEPRESSED OR HOPELESS: NOT AT ALL
SUM OF ALL RESPONSES TO PHQ QUESTIONS 1-9: 0
SUM OF ALL RESPONSES TO PHQ QUESTIONS 1-9: 0
SUM OF ALL RESPONSES TO PHQ9 QUESTIONS 1 & 2: 0
SUM OF ALL RESPONSES TO PHQ QUESTIONS 1-9: 0
1. LITTLE INTEREST OR PLEASURE IN DOING THINGS: NOT AT ALL

## 2024-02-07 ENCOUNTER — OFFICE VISIT (OUTPATIENT)
Dept: PRIMARY CARE CLINIC | Age: 49
End: 2024-02-07
Payer: COMMERCIAL

## 2024-02-07 VITALS
DIASTOLIC BLOOD PRESSURE: 76 MMHG | HEIGHT: 63 IN | BODY MASS INDEX: 33.7 KG/M2 | OXYGEN SATURATION: 100 % | HEART RATE: 85 BPM | RESPIRATION RATE: 16 BRPM | WEIGHT: 190.2 LBS | SYSTOLIC BLOOD PRESSURE: 122 MMHG

## 2024-02-07 DIAGNOSIS — Z00.00 ENCOUNTER FOR WELL ADULT EXAM WITHOUT ABNORMAL FINDINGS: Primary | ICD-10-CM

## 2024-02-07 DIAGNOSIS — E53.8 VITAMIN B 12 DEFICIENCY: ICD-10-CM

## 2024-02-07 DIAGNOSIS — Z12.11 SCREENING FOR COLON CANCER: ICD-10-CM

## 2024-02-07 DIAGNOSIS — Z13.6 ENCOUNTER FOR LIPID SCREENING FOR CARDIOVASCULAR DISEASE: ICD-10-CM

## 2024-02-07 DIAGNOSIS — E66.09 CLASS 1 OBESITY DUE TO EXCESS CALORIES WITHOUT SERIOUS COMORBIDITY WITH BODY MASS INDEX (BMI) OF 33.0 TO 33.9 IN ADULT: ICD-10-CM

## 2024-02-07 DIAGNOSIS — Z13.1 SCREENING FOR DIABETES MELLITUS: ICD-10-CM

## 2024-02-07 DIAGNOSIS — Z13.29 SCREENING FOR THYROID DISORDER: ICD-10-CM

## 2024-02-07 DIAGNOSIS — Z12.31 BREAST CANCER SCREENING BY MAMMOGRAM: ICD-10-CM

## 2024-02-07 DIAGNOSIS — Z13.0 SCREENING FOR DEFICIENCY ANEMIA: ICD-10-CM

## 2024-02-07 DIAGNOSIS — Z13.220 ENCOUNTER FOR LIPID SCREENING FOR CARDIOVASCULAR DISEASE: ICD-10-CM

## 2024-02-07 PROBLEM — E66.811 CLASS 1 OBESITY DUE TO EXCESS CALORIES WITHOUT SERIOUS COMORBIDITY WITH BODY MASS INDEX (BMI) OF 33.0 TO 33.9 IN ADULT: Status: ACTIVE | Noted: 2024-02-07

## 2024-02-07 PROCEDURE — 99396 PREV VISIT EST AGE 40-64: CPT | Performed by: NURSE PRACTITIONER

## 2024-02-07 SDOH — ECONOMIC STABILITY: FOOD INSECURITY: WITHIN THE PAST 12 MONTHS, THE FOOD YOU BOUGHT JUST DIDN'T LAST AND YOU DIDN'T HAVE MONEY TO GET MORE.: NEVER TRUE

## 2024-02-07 SDOH — ECONOMIC STABILITY: FOOD INSECURITY: WITHIN THE PAST 12 MONTHS, YOU WORRIED THAT YOUR FOOD WOULD RUN OUT BEFORE YOU GOT MONEY TO BUY MORE.: NEVER TRUE

## 2024-02-07 SDOH — ECONOMIC STABILITY: INCOME INSECURITY: HOW HARD IS IT FOR YOU TO PAY FOR THE VERY BASICS LIKE FOOD, HOUSING, MEDICAL CARE, AND HEATING?: NOT HARD AT ALL

## 2024-02-07 SDOH — ECONOMIC STABILITY: HOUSING INSECURITY
IN THE LAST 12 MONTHS, WAS THERE A TIME WHEN YOU DID NOT HAVE A STEADY PLACE TO SLEEP OR SLEPT IN A SHELTER (INCLUDING NOW)?: NO

## 2024-02-07 ASSESSMENT — ENCOUNTER SYMPTOMS
NAUSEA: 0
WHEEZING: 0
EYE ITCHING: 0
EYE REDNESS: 0
COUGH: 0
TROUBLE SWALLOWING: 0
DIARRHEA: 0
CHEST TIGHTNESS: 0
VOMITING: 0
EYE DISCHARGE: 0
ABDOMINAL PAIN: 0
SINUS PAIN: 0
SHORTNESS OF BREATH: 0
SINUS PRESSURE: 0
SORE THROAT: 0

## 2024-02-07 NOTE — PROGRESS NOTES
Well Adult Note  Name: Arcelia Treadwell Today’s Date: 2024   MRN: [id number] Sex: Adult   Age: 48 y.o. Ethnicity: Non- / Non    : 1975 Race: White (non-)      Arcelia Treadwell is here for well adult exam.  History:    Patient presents for her physical  Blood pressure stable  Weight is stable    Patient presents for her annual physical  She is following a dietitian.  This has been helpful.  She is exercising.    Due for pap. Cycles have been more irregular. C/o hot flashes sometimes, mild insomnia at times, about once a month.     Due for lab work.  She has not had a mammogram in quite some time    No other concerns    Review of Systems   Constitutional:  Negative for chills, fatigue and fever.   HENT:  Negative for ear discharge, ear pain, sinus pressure, sinus pain, sore throat and trouble swallowing.    Eyes:  Negative for discharge, redness and itching.   Respiratory:  Negative for cough, chest tightness, shortness of breath and wheezing.    Cardiovascular:  Negative for chest pain.   Gastrointestinal:  Negative for abdominal pain, diarrhea, nausea and vomiting.   Genitourinary:  Negative for difficulty urinating.   Musculoskeletal:  Negative for arthralgias and neck pain.   Skin:  Negative for rash.   Neurological:  Negative for dizziness, weakness, light-headedness and headaches.   All other systems reviewed and are negative.      No Known Allergies      Prior to Visit Medications    Not on File       History reviewed. No pertinent past medical history.    History reviewed. No pertinent surgical history.    History reviewed. No pertinent family history.    Social History     Tobacco Use    Smoking status: Never     Passive exposure: Yes    Smokeless tobacco: Never   Substance Use Topics    Alcohol use: Yes     Alcohol/week: 2.0 standard drinks of alcohol     Types: 1 Cans of beer, 1 Drinks containing 0.5 oz of alcohol per week     Comment: socially, twice monthly    Drug use: Never

## 2024-02-07 NOTE — PATIENT INSTRUCTIONS
Advance Care Planning     Advance Care Planning opens a door to talk about and write down your wishes before a sudden accident or illness.  Make your goals, values, and preferences known.     This puts you in the ’s seat and helps others know what matters most to you so they won’t have to guess.      Where can you learn more?    Go to [email or web address]   to learn how to:    Name someone you trust to make healthcare decisions for you, only if you can’t. (Healthcare Power of )    Document your wishes for care if you were seriously ill and not expected to recover or are approaching end of life. (Advance Directive or Living Will)    The same page can be found using the QR code below.                Starting a Weight Loss Plan: Care Instructions  Overview     If you're thinking about losing weight, it can be hard to know where to start. Your doctor can help you set up a weight loss plan that best meets your needs. You may want to take a class on nutrition or exercise, or you could join a weight loss support group. If you have questions about how to make changes to your eating or exercise habits, ask your doctor about seeing a registered dietitian or an exercise specialist.  It can be a big challenge to lose weight. But you don't have to make huge changes at once. Make small changes, and stick with them. When those changes become habit, add a few more changes.  If you don't think you're ready to make changes right now, try to pick a date in the future. Make an appointment to see your doctor to discuss whether the time is right for you to start a plan.  Follow-up care is a key part of your treatment and safety. Be sure to make and go to all appointments, and call your doctor if you are having problems. It's also a good idea to know your test results and keep a list of the medicines you take.  How can you care for yourself at home?  Set realistic goals. Many people

## 2024-04-11 ENCOUNTER — HOSPITAL ENCOUNTER (OUTPATIENT)
Age: 49
Setting detail: SPECIMEN
Discharge: HOME | End: 2024-04-11

## 2024-04-11 DIAGNOSIS — Z13.29 SCREENING FOR THYROID DISORDER: ICD-10-CM

## 2024-04-11 DIAGNOSIS — Z13.1 SCREENING FOR DIABETES MELLITUS: ICD-10-CM

## 2024-04-11 DIAGNOSIS — Z13.0 SCREENING FOR DEFICIENCY ANEMIA: ICD-10-CM

## 2024-04-11 DIAGNOSIS — Z00.00 ENCOUNTER FOR WELL ADULT EXAM WITHOUT ABNORMAL FINDINGS: ICD-10-CM

## 2024-04-11 DIAGNOSIS — Z13.220 ENCOUNTER FOR LIPID SCREENING FOR CARDIOVASCULAR DISEASE: ICD-10-CM

## 2024-04-11 DIAGNOSIS — Z13.6 ENCOUNTER FOR LIPID SCREENING FOR CARDIOVASCULAR DISEASE: ICD-10-CM

## 2024-04-11 LAB
ALBUMIN SERPL-MCNC: 4.2 G/DL (ref 3.5–5.2)
ALBUMIN/GLOB SERPL: 1 {RATIO} (ref 1–2.5)
ALP SERPL-CCNC: 47 U/L (ref 40–129)
ALT SERPL-CCNC: 15 U/L (ref 10–50)
ANION GAP SERPL CALCULATED.3IONS-SCNC: 10 MMOL/L (ref 9–16)
AST SERPL-CCNC: 27 U/L (ref 10–50)
BILIRUB SERPL-MCNC: 0.5 MG/DL (ref 0–1.2)
BUN SERPL-MCNC: 14 MG/DL (ref 6–20)
CALCIUM SERPL-MCNC: 9 MG/DL (ref 8.6–10.4)
CHLORIDE SERPL-SCNC: 103 MMOL/L (ref 98–107)
CHOLEST SERPL-MCNC: 175 MG/DL (ref 0–199)
CHOLESTEROL/HDL RATIO: 3
CO2 SERPL-SCNC: 24 MMOL/L (ref 20–31)
CREAT SERPL-MCNC: 0.7 MG/DL (ref 0.7–1.2)
ERYTHROCYTE [DISTWIDTH] IN BLOOD BY AUTOMATED COUNT: 13.1 % (ref 11.8–14.4)
EST. AVERAGE GLUCOSE BLD GHB EST-MCNC: 85 MG/DL
GFR SERPL CREATININE-BSD FRML MDRD: NORMAL ML/MIN/1.73M2
GLUCOSE SERPL-MCNC: 75 MG/DL (ref 74–99)
HBA1C MFR BLD: 4.6 % (ref 4–6)
HCT VFR BLD AUTO: 44 % (ref 40.7–50.3)
HDLC SERPL-MCNC: 61 MG/DL
HGB BLD-MCNC: 14.3 G/DL (ref 13–17)
LDLC SERPL CALC-MCNC: 98 MG/DL (ref 0–100)
MCH RBC QN AUTO: 30 PG (ref 25.2–33.5)
MCHC RBC AUTO-ENTMCNC: 32.5 G/DL (ref 28.4–34.8)
MCV RBC AUTO: 92.2 FL (ref 82.6–102.9)
NRBC BLD-RTO: 0 PER 100 WBC
PLATELET # BLD AUTO: 281 K/UL (ref 138–453)
PMV BLD AUTO: 9.1 FL (ref 8.1–13.5)
POTASSIUM SERPL-SCNC: 4.4 MMOL/L (ref 3.7–5.3)
PROT SERPL-MCNC: 7.3 G/DL (ref 6.6–8.7)
RBC # BLD AUTO: 4.77 M/UL (ref 4.21–5.77)
SODIUM SERPL-SCNC: 137 MMOL/L (ref 136–145)
T4 FREE SERPL-MCNC: 1.2 NG/DL (ref 0.92–1.68)
TRIGL SERPL-MCNC: 80 MG/DL
TSH SERPL DL<=0.05 MIU/L-ACNC: 1.17 UIU/ML (ref 0.27–4.2)
VLDLC SERPL CALC-MCNC: 16 MG/DL
WBC OTHER # BLD: 5 K/UL (ref 3.5–11.3)

## 2024-06-26 ENCOUNTER — OFFICE VISIT (OUTPATIENT)
Dept: PRIMARY CARE CLINIC | Age: 49
End: 2024-06-26
Payer: COMMERCIAL

## 2024-06-26 ENCOUNTER — TELEPHONE (OUTPATIENT)
Dept: PRIMARY CARE CLINIC | Age: 49
End: 2024-06-26

## 2024-06-26 ENCOUNTER — HOSPITAL ENCOUNTER (OUTPATIENT)
Age: 49
Setting detail: SPECIMEN
Discharge: HOME | End: 2024-06-26

## 2024-06-26 VITALS
DIASTOLIC BLOOD PRESSURE: 74 MMHG | SYSTOLIC BLOOD PRESSURE: 114 MMHG | RESPIRATION RATE: 18 BRPM | WEIGHT: 200.8 LBS | HEART RATE: 75 BPM | BODY MASS INDEX: 35.57 KG/M2 | OXYGEN SATURATION: 100 %

## 2024-06-26 DIAGNOSIS — E66.09 CLASS 2 OBESITY DUE TO EXCESS CALORIES WITHOUT SERIOUS COMORBIDITY WITH BODY MASS INDEX (BMI) OF 35.0 TO 35.9 IN ADULT: ICD-10-CM

## 2024-06-26 DIAGNOSIS — E66.09 CLASS 2 OBESITY DUE TO EXCESS CALORIES WITHOUT SERIOUS COMORBIDITY WITH BODY MASS INDEX (BMI) OF 35.0 TO 35.9 IN ADULT: Primary | ICD-10-CM

## 2024-06-26 DIAGNOSIS — Z71.3 DIETARY COUNSELING: ICD-10-CM

## 2024-06-26 DIAGNOSIS — Z12.4 CERVICAL CANCER SCREENING: Primary | ICD-10-CM

## 2024-06-26 PROCEDURE — 99396 PREV VISIT EST AGE 40-64: CPT | Performed by: NURSE PRACTITIONER

## 2024-06-26 PROCEDURE — 99213 OFFICE O/P EST LOW 20 MIN: CPT | Performed by: NURSE PRACTITIONER

## 2024-06-26 RX ORDER — TIRZEPATIDE 2.5 MG/.5ML
2.5 INJECTION, SOLUTION SUBCUTANEOUS WEEKLY
Qty: 2 ML | Refills: 3 | Status: SHIPPED | OUTPATIENT
Start: 2024-06-26

## 2024-06-26 ASSESSMENT — ENCOUNTER SYMPTOMS
EYE ITCHING: 0
SINUS PRESSURE: 0
SHORTNESS OF BREATH: 0
EYE REDNESS: 0
NAUSEA: 0
ABDOMINAL PAIN: 0
WHEEZING: 0
VOMITING: 0
COUGH: 0
SORE THROAT: 0
SINUS PAIN: 0
DIARRHEA: 0
EYE DISCHARGE: 0
CHEST TIGHTNESS: 0
TROUBLE SWALLOWING: 0

## 2024-06-26 ASSESSMENT — PATIENT HEALTH QUESTIONNAIRE - PHQ9
SUM OF ALL RESPONSES TO PHQ QUESTIONS 1-9: 0
SUM OF ALL RESPONSES TO PHQ QUESTIONS 1-9: 0
2. FEELING DOWN, DEPRESSED OR HOPELESS: NOT AT ALL
SUM OF ALL RESPONSES TO PHQ QUESTIONS 1-9: 0
1. LITTLE INTEREST OR PLEASURE IN DOING THINGS: NOT AT ALL
SUM OF ALL RESPONSES TO PHQ9 QUESTIONS 1 & 2: 0
SUM OF ALL RESPONSES TO PHQ QUESTIONS 1-9: 0

## 2024-06-26 NOTE — TELEPHONE ENCOUNTER
Spoke with the patient and informed of PCP's instructions, the patient verbalized understanding of PCP instructions.

## 2024-06-26 NOTE — TELEPHONE ENCOUNTER
Zepbound PA denied    Received fax from [facility] pharmacy stating that preferred products include saxenda or wegovy    Please advise

## 2024-06-26 NOTE — PROGRESS NOTES
Abdomen is flat. Bowel sounds are normal. There is no distension.      Palpations: Abdomen is soft.      Tenderness: There is no abdominal tenderness. There is no guarding.   Genitourinary:     Exam position: Supine.      Cervix: Normal.      Adnexa: Right adnexa normal and left adnexa normal.   Musculoskeletal:         General: Normal range of motion.      Cervical back: Normal range of motion and neck supple.   Skin:     General: Skin is warm and dry.      Capillary Refill: Capillary refill takes less than 2 seconds.   Neurological:      General: No focal deficit present.      Mental Status: She is alert and oriented to person, place, and time.      Motor: No weakness.      Coordination: Coordination normal.      Gait: Gait normal.   Psychiatric:         Mood and Affect: Mood normal.         Behavior: Behavior normal.         Thought Content: Thought content normal.       /74   Pulse 75   Resp 18   Wt 91.1 kg (200 lb 12.8 oz)   SpO2 100%   BMI 35.57 kg/m²     Assessment:   Assessment & Plan    Diagnosis Orders   1. Cervical cancer screening  PAP Smear      2. Class 2 obesity due to excess calories without serious comorbidity with body mass index (BMI) of 35.0 to 35.9 in adult  Tirzepatide-Weight Management (ZEPBOUND) 2.5 MG/0.5ML SOAJ      3. Dietary counseling            :      Return in about 6 weeks (around 8/7/2024) for zepbound follow up.  1.  Cervical cancer screening  Pap completed  2-3.  Dietary counseling and obesity  The patient is interested in weight loss injections.  Will order zepbound 2.5 mg subcu weekly.  Continue with healthy lifestyle changes    Follow-up in 6 weeks or sooner as needed.  She is to let me know if medication is not covered to discuss other options  Orders Placed This Encounter   Procedures    PAP Smear     Patient History:    No LMP recorded. (Menstrual status: Irregular periods).  OBGYN Status: Irregular periods  No past surgical history on file.      Social History

## 2024-06-27 NOTE — TELEPHONE ENCOUNTER
Marjorie GANDHI denied due to being excluded from plan    Called patient and explained denial and informed that insurance may not cover weight loss meds and advised to contact insurance to see if any other meds are covered. Patient verbalized understanding

## 2024-07-10 LAB — CYTOLOGY REPORT: NORMAL

## 2024-09-04 ENCOUNTER — OFFICE VISIT (OUTPATIENT)
Dept: PRIMARY CARE CLINIC | Age: 49
End: 2024-09-04
Payer: COMMERCIAL

## 2024-09-04 VITALS
OXYGEN SATURATION: 99 % | BODY MASS INDEX: 33.24 KG/M2 | RESPIRATION RATE: 16 BRPM | DIASTOLIC BLOOD PRESSURE: 80 MMHG | WEIGHT: 187.6 LBS | SYSTOLIC BLOOD PRESSURE: 120 MMHG | HEIGHT: 63 IN | HEART RATE: 80 BPM

## 2024-09-04 DIAGNOSIS — E66.09 CLASS 1 OBESITY DUE TO EXCESS CALORIES WITHOUT SERIOUS COMORBIDITY WITH BODY MASS INDEX (BMI) OF 33.0 TO 33.9 IN ADULT: ICD-10-CM

## 2024-09-04 DIAGNOSIS — Z71.3 DIETARY COUNSELING: Primary | ICD-10-CM

## 2024-09-04 PROBLEM — H10.011 ACUTE FOLLICULAR CONJUNCTIVITIS, RIGHT EYE: Status: RESOLVED | Noted: 2020-05-09 | Resolved: 2024-09-04

## 2024-09-04 PROBLEM — H10.011 ACUTE FOLLICULAR CONJUNCTIVITIS, RIGHT EYE: Status: ACTIVE | Noted: 2020-05-09

## 2024-09-04 PROCEDURE — 99213 OFFICE O/P EST LOW 20 MIN: CPT | Performed by: NURSE PRACTITIONER

## 2024-09-04 SDOH — ECONOMIC STABILITY: FOOD INSECURITY: WITHIN THE PAST 12 MONTHS, YOU WORRIED THAT YOUR FOOD WOULD RUN OUT BEFORE YOU GOT MONEY TO BUY MORE.: NEVER TRUE

## 2024-09-04 SDOH — ECONOMIC STABILITY: FOOD INSECURITY: WITHIN THE PAST 12 MONTHS, THE FOOD YOU BOUGHT JUST DIDN'T LAST AND YOU DIDN'T HAVE MONEY TO GET MORE.: NEVER TRUE

## 2024-09-04 SDOH — ECONOMIC STABILITY: INCOME INSECURITY: HOW HARD IS IT FOR YOU TO PAY FOR THE VERY BASICS LIKE FOOD, HOUSING, MEDICAL CARE, AND HEATING?: NOT HARD AT ALL

## 2024-09-04 ASSESSMENT — ENCOUNTER SYMPTOMS
EYE REDNESS: 0
SINUS PRESSURE: 0
WHEEZING: 0
ABDOMINAL PAIN: 0
EYE ITCHING: 0
CHEST TIGHTNESS: 0
DIARRHEA: 0
NAUSEA: 0
SORE THROAT: 0
EYE DISCHARGE: 0
SINUS PAIN: 0
SHORTNESS OF BREATH: 0
TROUBLE SWALLOWING: 0
COUGH: 0
VOMITING: 0

## 2024-09-04 ASSESSMENT — PATIENT HEALTH QUESTIONNAIRE - PHQ9
2. FEELING DOWN, DEPRESSED OR HOPELESS: NOT AT ALL
SUM OF ALL RESPONSES TO PHQ QUESTIONS 1-9: 0
SUM OF ALL RESPONSES TO PHQ QUESTIONS 1-9: 0
SUM OF ALL RESPONSES TO PHQ9 QUESTIONS 1 & 2: 0
SUM OF ALL RESPONSES TO PHQ QUESTIONS 1-9: 0
SUM OF ALL RESPONSES TO PHQ QUESTIONS 1-9: 0
1. LITTLE INTEREST OR PLEASURE IN DOING THINGS: NOT AT ALL

## 2024-09-04 NOTE — PROGRESS NOTES
prescribed medications.All patient questions answered.  Pt voiced understanding. Reviewed health maintenance.Instructed to continue current medications, diet and exercise.  Patient agreedwith treatment plan. Follow up as directed.      Electronically signed by LELA Bueno CNP on 9/4/2024at 1:06 PM

## 2025-01-11 SDOH — ECONOMIC STABILITY: FOOD INSECURITY: WITHIN THE PAST 12 MONTHS, THE FOOD YOU BOUGHT JUST DIDN'T LAST AND YOU DIDN'T HAVE MONEY TO GET MORE.: NEVER TRUE

## 2025-01-11 SDOH — ECONOMIC STABILITY: FOOD INSECURITY: WITHIN THE PAST 12 MONTHS, YOU WORRIED THAT YOUR FOOD WOULD RUN OUT BEFORE YOU GOT MONEY TO BUY MORE.: NEVER TRUE

## 2025-01-11 SDOH — ECONOMIC STABILITY: TRANSPORTATION INSECURITY
IN THE PAST 12 MONTHS, HAS THE LACK OF TRANSPORTATION KEPT YOU FROM MEDICAL APPOINTMENTS OR FROM GETTING MEDICATIONS?: NO

## 2025-01-11 SDOH — ECONOMIC STABILITY: INCOME INSECURITY: IN THE LAST 12 MONTHS, WAS THERE A TIME WHEN YOU WERE NOT ABLE TO PAY THE MORTGAGE OR RENT ON TIME?: NO

## 2025-01-11 SDOH — ECONOMIC STABILITY: TRANSPORTATION INSECURITY
IN THE PAST 12 MONTHS, HAS LACK OF TRANSPORTATION KEPT YOU FROM MEETINGS, WORK, OR FROM GETTING THINGS NEEDED FOR DAILY LIVING?: NO

## 2025-01-11 ASSESSMENT — PATIENT HEALTH QUESTIONNAIRE - PHQ9
SUM OF ALL RESPONSES TO PHQ QUESTIONS 1-9: 0
SUM OF ALL RESPONSES TO PHQ QUESTIONS 1-9: 0
1. LITTLE INTEREST OR PLEASURE IN DOING THINGS: NOT AT ALL
SUM OF ALL RESPONSES TO PHQ9 QUESTIONS 1 & 2: 0
SUM OF ALL RESPONSES TO PHQ QUESTIONS 1-9: 0
2. FEELING DOWN, DEPRESSED OR HOPELESS: NOT AT ALL
SUM OF ALL RESPONSES TO PHQ9 QUESTIONS 1 & 2: 0
2. FEELING DOWN, DEPRESSED OR HOPELESS: NOT AT ALL
1. LITTLE INTEREST OR PLEASURE IN DOING THINGS: NOT AT ALL
SUM OF ALL RESPONSES TO PHQ QUESTIONS 1-9: 0

## 2025-01-14 ENCOUNTER — OFFICE VISIT (OUTPATIENT)
Dept: PRIMARY CARE CLINIC | Age: 50
End: 2025-01-14
Payer: COMMERCIAL

## 2025-01-14 VITALS
DIASTOLIC BLOOD PRESSURE: 68 MMHG | OXYGEN SATURATION: 98 % | WEIGHT: 168.8 LBS | BODY MASS INDEX: 29.9 KG/M2 | RESPIRATION RATE: 16 BRPM | HEART RATE: 77 BPM | SYSTOLIC BLOOD PRESSURE: 116 MMHG

## 2025-01-14 DIAGNOSIS — E66.09 CLASS 1 OBESITY DUE TO EXCESS CALORIES WITHOUT SERIOUS COMORBIDITY WITH BODY MASS INDEX (BMI) OF 33.0 TO 33.9 IN ADULT: ICD-10-CM

## 2025-01-14 DIAGNOSIS — Z71.3 DIETARY COUNSELING: Primary | ICD-10-CM

## 2025-01-14 DIAGNOSIS — E66.811 CLASS 1 OBESITY DUE TO EXCESS CALORIES WITHOUT SERIOUS COMORBIDITY WITH BODY MASS INDEX (BMI) OF 33.0 TO 33.9 IN ADULT: ICD-10-CM

## 2025-01-14 PROCEDURE — 99213 OFFICE O/P EST LOW 20 MIN: CPT | Performed by: NURSE PRACTITIONER

## 2025-01-14 ASSESSMENT — ENCOUNTER SYMPTOMS
NAUSEA: 0
TROUBLE SWALLOWING: 0
SORE THROAT: 0
EYE REDNESS: 0
SINUS PAIN: 0
SHORTNESS OF BREATH: 0
WHEEZING: 0
SINUS PRESSURE: 0
ABDOMINAL PAIN: 0
CHEST TIGHTNESS: 0
VOMITING: 0
EYE ITCHING: 0
COUGH: 0
EYE DISCHARGE: 0
DIARRHEA: 0

## 2025-01-14 NOTE — PROGRESS NOTES
[facility]  [facility] PRIMARY CARE  [address] [place] [address] [address]  [address]  [facility] [address]  Dept: [phone]  Dept Fax: [phone]    Arcelia Treadwell is a 49 y.o. adult who presentstoday for her medical conditions/complaints as noted below.  Arcelia Treadwell is c/o of  Chief Complaint   Patient presents with    3 Month Follow-Up     Follow up for weight and Zepbound           HPI:     History of Present Illness  The patient presents for weight management.    She has been on a weight loss journey, with a significant milestone being the loss of 32 pounds since June 2024. She attributes this success to the medication prescribed by her healthcare provider. She had a brief period of discontinuation due to wisdom teeth extraction and subsequent recovery, during which she was on a liquid diet. This hiatus lasted approximately 6 to 8 weeks. Currently, she is on a 2.5 mg dose of the medication, which she extends to a 2-week interval. She reports minimal hunger pangs and no food cravings, even with the extended dosing schedule. She has 5 doses remaining, which will last her for 10 weeks. Her insurance does not cover the cost of the medication, which is a concern for her. She has not been engaging in regular exercise but maintains a balanced diet, focusing on protein and low carbohydrates. Initially, she experienced constipation, which she managed with probiotic gummies and a diet including yogurt, trail mix, and dried fruit. Her goal weight is 150 pounds, with an ideal target of 140 pounds.      Hemoglobin A1C (%)   Date Value   04/11/2024 4.6             ( goal A1C is < 7)   No components found for: \"LABMICR\"  No components found for: \"LDLCHOLESTEROL\", \"LDLCALC\"    (goal LDL is <100)   AST (U/L)   Date Value   04/11/2024 27     ALT (U/L)   Date Value   04/11/2024 15     BUN (mg/dL)   Date Value   04/11/2024 14     BP Readings from Last 3 Encounters:   01/14/25 116/68   09/04/24 120/80   06/26/24

## 2025-03-18 DIAGNOSIS — E66.812 CLASS 2 OBESITY DUE TO EXCESS CALORIES WITHOUT SERIOUS COMORBIDITY WITH BODY MASS INDEX (BMI) OF 35.0 TO 35.9 IN ADULT: ICD-10-CM

## 2025-03-18 DIAGNOSIS — E66.09 CLASS 2 OBESITY DUE TO EXCESS CALORIES WITHOUT SERIOUS COMORBIDITY WITH BODY MASS INDEX (BMI) OF 35.0 TO 35.9 IN ADULT: ICD-10-CM

## 2025-03-18 RX ORDER — TIRZEPATIDE 2.5 MG/.5ML
2.5 INJECTION, SOLUTION SUBCUTANEOUS WEEKLY
Qty: 2 ML | Refills: 3 | Status: SHIPPED | OUTPATIENT
Start: 2025-03-18

## 2025-04-29 ENCOUNTER — OFFICE VISIT (OUTPATIENT)
Dept: PRIMARY CARE CLINIC | Age: 50
End: 2025-04-29
Payer: COMMERCIAL

## 2025-04-29 VITALS
DIASTOLIC BLOOD PRESSURE: 76 MMHG | HEART RATE: 72 BPM | OXYGEN SATURATION: 99 % | SYSTOLIC BLOOD PRESSURE: 120 MMHG | BODY MASS INDEX: 28.63 KG/M2 | WEIGHT: 161.6 LBS

## 2025-04-29 DIAGNOSIS — E66.812 CLASS 2 OBESITY DUE TO EXCESS CALORIES WITHOUT SERIOUS COMORBIDITY WITH BODY MASS INDEX (BMI) OF 35.0 TO 35.9 IN ADULT: ICD-10-CM

## 2025-04-29 DIAGNOSIS — Z71.3 DIETARY COUNSELING: Primary | ICD-10-CM

## 2025-04-29 DIAGNOSIS — E66.09 CLASS 2 OBESITY DUE TO EXCESS CALORIES WITHOUT SERIOUS COMORBIDITY WITH BODY MASS INDEX (BMI) OF 35.0 TO 35.9 IN ADULT: ICD-10-CM

## 2025-04-29 PROCEDURE — 99213 OFFICE O/P EST LOW 20 MIN: CPT | Performed by: NURSE PRACTITIONER

## 2025-04-29 SDOH — ECONOMIC STABILITY: FOOD INSECURITY: WITHIN THE PAST 12 MONTHS, THE FOOD YOU BOUGHT JUST DIDN'T LAST AND YOU DIDN'T HAVE MONEY TO GET MORE.: NEVER TRUE

## 2025-04-29 SDOH — ECONOMIC STABILITY: FOOD INSECURITY: WITHIN THE PAST 12 MONTHS, YOU WORRIED THAT YOUR FOOD WOULD RUN OUT BEFORE YOU GOT MONEY TO BUY MORE.: NEVER TRUE

## 2025-04-29 ASSESSMENT — ENCOUNTER SYMPTOMS
WHEEZING: 0
SHORTNESS OF BREATH: 0
EYE DISCHARGE: 0
NAUSEA: 0
ABDOMINAL PAIN: 0
EYE ITCHING: 0
VOMITING: 0
DIARRHEA: 0
SINUS PRESSURE: 0
SORE THROAT: 0
TROUBLE SWALLOWING: 0
SINUS PAIN: 0
CHEST TIGHTNESS: 0
COUGH: 0
EYE REDNESS: 0

## 2025-04-29 ASSESSMENT — PATIENT HEALTH QUESTIONNAIRE - PHQ9
SUM OF ALL RESPONSES TO PHQ QUESTIONS 1-9: 0
SUM OF ALL RESPONSES TO PHQ QUESTIONS 1-9: 0
1. LITTLE INTEREST OR PLEASURE IN DOING THINGS: NOT AT ALL
SUM OF ALL RESPONSES TO PHQ QUESTIONS 1-9: 0
2. FEELING DOWN, DEPRESSED OR HOPELESS: NOT AT ALL
SUM OF ALL RESPONSES TO PHQ QUESTIONS 1-9: 0

## 2025-04-29 NOTE — PROGRESS NOTES
not in acute distress.     Appearance: Normal appearance. She is normal weight. She is not ill-appearing.   HENT:      Head: Normocephalic and atraumatic.      Nose: Nose normal. No congestion or rhinorrhea.      Mouth/Throat:      Mouth: Mucous membranes are moist.      Pharynx: Oropharynx is clear. No oropharyngeal exudate or posterior oropharyngeal erythema.   Eyes:      Extraocular Movements: Extraocular movements intact.      Conjunctiva/sclera: Conjunctivae normal.      Pupils: Pupils are equal, round, and reactive to light.   Cardiovascular:      Rate and Rhythm: Normal rate and regular rhythm.      Pulses: Normal pulses.      Heart sounds: Normal heart sounds. No murmur heard.  Pulmonary:      Effort: Pulmonary effort is normal. No respiratory distress.      Breath sounds: Normal breath sounds. No wheezing.   Abdominal:      General: Abdomen is flat. Bowel sounds are normal. There is no distension.      Palpations: Abdomen is soft.      Tenderness: There is no abdominal tenderness. There is no guarding.   Musculoskeletal:         General: Normal range of motion.      Cervical back: Normal range of motion and neck supple.   Skin:     General: Skin is warm and dry.      Capillary Refill: Capillary refill takes less than 2 seconds.   Neurological:      General: No focal deficit present.      Mental Status: She is alert and oriented to person, place, and time.      Motor: No weakness.      Coordination: Coordination normal.      Gait: Gait normal.   Psychiatric:         Mood and Affect: Mood normal.         Behavior: Behavior normal.         Thought Content: Thought content normal.           :       Diagnosis Orders   1. Dietary counseling        2. Class 2 obesity due to excess calories without serious comorbidity with body mass index (BMI) of 35.0 to 35.9 in adult                  :   Assessment & Plan     Assessment & Plan  1. Weight management.  - Achieved a commendable weight loss of 40 pounds on a regimen

## 2025-09-03 ENCOUNTER — COMMUNITY OUTREACH (OUTPATIENT)
Dept: PRIMARY CARE CLINIC | Age: 50
End: 2025-09-03